# Patient Record
Sex: MALE | Race: WHITE | NOT HISPANIC OR LATINO | Employment: OTHER | URBAN - METROPOLITAN AREA
[De-identification: names, ages, dates, MRNs, and addresses within clinical notes are randomized per-mention and may not be internally consistent; named-entity substitution may affect disease eponyms.]

---

## 2022-09-23 ENCOUNTER — OFFICE VISIT (OUTPATIENT)
Dept: URGENT CARE | Facility: CLINIC | Age: 47
End: 2022-09-23
Payer: OTHER GOVERNMENT

## 2022-09-23 VITALS
HEIGHT: 73 IN | HEART RATE: 85 BPM | OXYGEN SATURATION: 97 % | BODY MASS INDEX: 30.83 KG/M2 | DIASTOLIC BLOOD PRESSURE: 102 MMHG | RESPIRATION RATE: 18 BRPM | TEMPERATURE: 98 F | SYSTOLIC BLOOD PRESSURE: 182 MMHG | WEIGHT: 232.63 LBS

## 2022-09-23 DIAGNOSIS — I10 PRIMARY HYPERTENSION: ICD-10-CM

## 2022-09-23 DIAGNOSIS — B37.0 THRUSH, ORAL: Primary | ICD-10-CM

## 2022-09-23 LAB
CTP QC/QA: YES
S PYO RRNA THROAT QL PROBE: NEGATIVE

## 2022-09-23 PROCEDURE — 99204 PR OFFICE/OUTPT VISIT, NEW, LEVL IV, 45-59 MIN: ICD-10-PCS | Mod: S$GLB,,, | Performed by: NURSE PRACTITIONER

## 2022-09-23 PROCEDURE — 99204 OFFICE O/P NEW MOD 45 MIN: CPT | Mod: S$GLB,,, | Performed by: NURSE PRACTITIONER

## 2022-09-23 PROCEDURE — 87880 STREP A ASSAY W/OPTIC: CPT | Mod: QW,,, | Performed by: NURSE PRACTITIONER

## 2022-09-23 PROCEDURE — 87880 POCT RAPID STREP A: ICD-10-PCS | Mod: QW,,, | Performed by: NURSE PRACTITIONER

## 2022-09-23 RX ORDER — NYSTATIN 100000 [USP'U]/ML
6 SUSPENSION ORAL 4 TIMES DAILY
Qty: 240 ML | Refills: 0 | Status: SHIPPED | OUTPATIENT
Start: 2022-09-23 | End: 2022-10-03

## 2022-09-23 NOTE — PROGRESS NOTES
"Subjective:       Patient ID: Manny Anna is a 47 y.o. male.    Vitals:  height is 6' 1" (1.854 m) and weight is 105.5 kg (232 lb 9.6 oz). His temperature is 97.9 °F (36.6 °C). His blood pressure is 182/102 (abnormal) and his pulse is 85. His respiration is 18 and oxygen saturation is 97%.     Chief Complaint: Mouth Lesions    Mouth Lesions   The current episode started more than 2 weeks ago (x's 3 weeks). The onset was sudden. The problem has been gradually worsening. Nothing relieves the symptoms. Associated symptoms include sore throat. Pertinent negatives include no fever, no abdominal pain, no nausea, no vomiting, no congestion, no mouth sores, no cough and no rash.     Constitution: Negative for chills and fever.   HENT:  Positive for tongue pain, tongue lesion and sore throat. Negative for mouth sores, congestion, trouble swallowing and voice change.    Neck: Negative for painful lymph nodes.   Cardiovascular:  Negative for chest pain, palpitations and sob on exertion.   Respiratory:  Negative for cough and shortness of breath.    Gastrointestinal:  Negative for abdominal pain, nausea and vomiting.   Musculoskeletal:  Negative for joint pain and muscle cramps.   Skin:  Negative for rash.   Neurological:  Negative for dizziness, light-headedness, passing out, disorientation and altered mental status.   Hematologic/Lymphatic: Negative for swollen lymph nodes.   Psychiatric/Behavioral:  Negative for altered mental status, disorientation and confusion.      Objective:      Physical Exam   Constitutional: He is oriented to person, place, and time. He appears well-developed. He is cooperative.  Non-toxic appearance. He does not appear ill. No distress.   HENT:   Head: Normocephalic and atraumatic.   Ears:   Right Ear: Hearing, tympanic membrane, external ear and ear canal normal.   Left Ear: Hearing, tympanic membrane, external ear and ear canal normal.   Nose: Nose normal. No mucosal edema, rhinorrhea or nasal " deformity. No epistaxis. Right sinus exhibits no maxillary sinus tenderness and no frontal sinus tenderness. Left sinus exhibits no maxillary sinus tenderness and no frontal sinus tenderness.   Mouth/Throat: Uvula is midline, oropharynx is clear and moist and mucous membranes are normal. Mucous membranes are moist. Oral lesions present. No trismus in the jaw. Normal dentition. No uvula swelling. No oropharyngeal exudate, posterior oropharyngeal edema, posterior oropharyngeal erythema, tonsillar abscesses or cobblestoning. Tonsils are 1+ on the right. Tonsils are 1+ on the left. No tonsillar exudate.       Eyes: Conjunctivae and lids are normal. No scleral icterus.   Neck: Trachea normal and phonation normal. Neck supple. No edema present. No erythema present. No neck rigidity present.   Cardiovascular: Normal rate, regular rhythm, normal heart sounds and normal pulses.   Pulmonary/Chest: Effort normal and breath sounds normal. No respiratory distress. He has no decreased breath sounds. He has no rhonchi.   Abdominal: Normal appearance.   Musculoskeletal: Normal range of motion.         General: No deformity. Normal range of motion.   Lymphadenopathy:     He has no cervical adenopathy.   Neurological: He is alert and oriented to person, place, and time. He exhibits normal muscle tone. Coordination normal.   Skin: Skin is warm, dry, intact, not diaphoretic and not pale. Capillary refill takes 2 to 3 seconds.   Psychiatric: His speech is normal and behavior is normal. Judgment and thought content normal.   Nursing note and vitals reviewed.      Assessment:       1. Thrush, oral    2. Primary hypertension          Plan:         Thrush, oral  -     POCT rapid strep A  -     nystatin (MYCOSTATIN) 100,000 unit/mL suspension; Take 6 mLs (600,000 Units total) by mouth 4 (four) times daily. Swish for 5 minutes then swallow for 10 days  Dispense: 240 mL; Refill: 0    Primary hypertension  Strep POCT negative       I have  discussed the test results and physical exam findings with the patient. We discussed symptom monitoring, treatment options, medication use, and follow up orders. He verbalized understanding and agreement with the plan of care.       Start taking HCTZ as prescribed  Take nystatin swish, gargle and swallow   Continue to clean tongue with toothbrush. Discard toothbrush and replace at end of nystatin course.  Avoid antibacterial or antiseptic mouthwash until issue is resolved  Follow up with pcp  Return to clinic for any new concern.

## 2022-09-23 NOTE — PATIENT INSTRUCTIONS
Start taking HCTZ as prescribed  Take nystatin swish, gargle and swallow   Continue to clean tongue with toothbrush. Discard toothbrush and replace at end of nystatin course.  Avoid antibacterial or antiseptic mouthwash until issue is resolved  Follow up with pcp  Return to clinic for any new concern.

## 2024-02-21 DIAGNOSIS — R10.11 ABDOMINAL PAIN, RIGHT UPPER QUADRANT: Primary | ICD-10-CM

## 2024-02-21 DIAGNOSIS — N45.2 ACUTE ORCHITIS: ICD-10-CM

## 2024-02-28 ENCOUNTER — HOSPITAL ENCOUNTER (OUTPATIENT)
Dept: RADIOLOGY | Facility: HOSPITAL | Age: 49
Discharge: HOME | End: 2024-02-28
Attending: SPECIALIST
Payer: OTHER GOVERNMENT

## 2024-02-28 DIAGNOSIS — Z12.5 SPECIAL SCREENING FOR MALIGNANT NEOPLASM OF PROSTATE: Primary | ICD-10-CM

## 2024-02-28 DIAGNOSIS — N45.2 ACUTE ORCHITIS: ICD-10-CM

## 2024-02-28 DIAGNOSIS — R10.11 ABDOMINAL PAIN, RIGHT UPPER QUADRANT: ICD-10-CM

## 2024-02-28 PROCEDURE — 76770 US EXAM ABDO BACK WALL COMP: CPT | Mod: TC,PO
